# Patient Record
Sex: FEMALE | Race: OTHER | URBAN - METROPOLITAN AREA
[De-identification: names, ages, dates, MRNs, and addresses within clinical notes are randomized per-mention and may not be internally consistent; named-entity substitution may affect disease eponyms.]

---

## 2019-08-08 NOTE — PATIENT DISCUSSION
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 Start Travatan Z one drop ou hs and Combigan one drop ou am and suppertime.

## 2021-08-06 ENCOUNTER — IMPORTED ENCOUNTER (OUTPATIENT)
Dept: URBAN - METROPOLITAN AREA CLINIC 9 | Facility: CLINIC | Age: 51
End: 2021-08-06

## 2021-08-06 PROBLEM — H40.1113: Status: STABILIZING | Noted: 2021-08-06

## 2021-10-16 ASSESSMENT — TONOMETRY
OD_IOP_MMHG: 15
OS_IOP_MMHG: 49

## 2022-07-26 ENCOUNTER — ESTABLISHED PATIENT (OUTPATIENT)
Dept: URBAN - METROPOLITAN AREA CLINIC 15 | Facility: CLINIC | Age: 52
End: 2022-07-26

## 2022-07-26 DIAGNOSIS — H40.1113: ICD-10-CM

## 2022-07-26 DIAGNOSIS — H27.02: ICD-10-CM

## 2022-07-26 DIAGNOSIS — H40.2223: ICD-10-CM

## 2022-07-26 PROCEDURE — 92133 CPTRZD OPH DX IMG PST SGM ON: CPT

## 2022-07-26 PROCEDURE — 92014 COMPRE OPH EXAM EST PT 1/>: CPT

## 2022-07-26 ASSESSMENT — TONOMETRY: OD_IOP_MMHG: 21
